# Patient Record
Sex: MALE | Race: WHITE | URBAN - METROPOLITAN AREA
[De-identification: names, ages, dates, MRNs, and addresses within clinical notes are randomized per-mention and may not be internally consistent; named-entity substitution may affect disease eponyms.]

---

## 2019-01-23 ENCOUNTER — APPOINTMENT (OUTPATIENT)
Dept: GENERAL RADIOLOGY | Facility: CLINIC | Age: 24
End: 2019-01-23
Payer: OTHER MISCELLANEOUS

## 2019-01-23 ENCOUNTER — HOSPITAL ENCOUNTER (EMERGENCY)
Facility: CLINIC | Age: 24
Discharge: HOME | End: 2019-01-23
Attending: EMERGENCY MEDICINE | Admitting: EMERGENCY MEDICINE
Payer: OTHER MISCELLANEOUS

## 2019-01-23 VITALS
SYSTOLIC BLOOD PRESSURE: 129 MMHG | HEART RATE: 63 BPM | WEIGHT: 182 LBS | HEIGHT: 72 IN | OXYGEN SATURATION: 99 % | TEMPERATURE: 98.8 F | RESPIRATION RATE: 18 BRPM | DIASTOLIC BLOOD PRESSURE: 78 MMHG | BODY MASS INDEX: 24.65 KG/M2

## 2019-01-23 DIAGNOSIS — S60.221A CONTUSION OF RIGHT HAND, INITIAL ENCOUNTER: ICD-10-CM

## 2019-01-23 PROCEDURE — 99283 EMERGENCY DEPT VISIT LOW MDM: CPT

## 2019-01-23 PROCEDURE — 25000132 ZZH RX MED GY IP 250 OP 250 PS 637: Performed by: EMERGENCY MEDICINE

## 2019-01-23 PROCEDURE — 73130 X-RAY EXAM OF HAND: CPT | Mod: RT

## 2019-01-23 RX ORDER — IBUPROFEN 600 MG/1
600 TABLET, FILM COATED ORAL ONCE
Status: COMPLETED | OUTPATIENT
Start: 2019-01-23 | End: 2019-01-23

## 2019-01-23 RX ORDER — IBUPROFEN 800 MG/1
800 TABLET, FILM COATED ORAL EVERY 8 HOURS PRN
Qty: 60 TABLET | Refills: 0 | Status: SHIPPED | OUTPATIENT
Start: 2019-01-23 | End: 2019-01-31

## 2019-01-23 RX ADMIN — IBUPROFEN 600 MG: 600 TABLET ORAL at 10:29

## 2019-01-23 ASSESSMENT — ENCOUNTER SYMPTOMS: FEVER: 0

## 2019-01-23 ASSESSMENT — MIFFLIN-ST. JEOR: SCORE: 1853.55

## 2019-01-23 NOTE — ED PROVIDER NOTES
History     Chief Complaint:  Hand Pain    HPI   Cody Ryan Baumgarten is a 23 year old male who presents with hand pain. The patient slipped and fell on the ice. He went to brace himself for the fall using his right hand resulting in pain to his right wrist. The patient denies hitting his head or any loss of consciousness. He denies being on blood thinners. He states he writes with his left hand but does everything else with his right hand.     Allergies:  No known drug allergies.    Medications:    The patient is currently on no regular medications.     Past Medical History:    History reviewed.  No significant past medical history.     Past Surgical History:    History reviewed. No pertinent past surgical history.    Family History:    History reviewed. No pertinent family history.    Social History:  Patient is single  Tobacco Use: Current smoker  Alcohol Use: Yes  PCP: Physician No Ref-Primary     Review of Systems   Constitutional: Negative for fever.   Musculoskeletal:        Right wrist pain   Neurological:        No LOC   All other systems reviewed and are negative.    Physical Exam   First Vitals:  Patient Vitals for the past 24 hrs:   BP Temp Temp src Pulse Resp SpO2 Height Weight   01/23/19 1018 129/78 98.8  F (37.1  C) Oral 63 18 99 % 1.829 m (6') 82.6 kg (182 lb)     Physical Exam  General: Patient is alert and normal appearing.  HEENT: Head atraumatic    Eyes: pupils equal and reactive. Conjunctiva clear   Nares: patent   Oropharynx: no lesions, uvula midline, no palatal draping, normal voice, no trismus  Neck: Supple without lymphadenopathy, no meningismus  Chest: Heart regular rate and rhythm.   Lungs: Equal clear to auscultation with no wheeze or rales  Abdomen: Soft, non tender, nondistended, normal bowel sounds  Back: No costovertebral angle tenderness, no midline C, T or L spine tenderness  Neuro: Grossly nonfocal, normal speech, strength equal bilaterally, CN 2-12 intact  Extremities: No  deformities, equal radial and DP pulses. No clubbing, cyanosis. Right Hand: No edema, tenderness of the right hand thenar eminence with no tenderness of the snuffbox or on axial loading of the right thumb, no distal radius or ulna tenderness to palpation, distal is neurovascularly intact.  Skin: Warm and dry with no rash.       Emergency Department Course     Imaging:  Radiographic findings were communicated with the patient who voiced understanding of the findings.  XR hand right 3 views:  No definite acute abnormality per radiology read.    Interventions:  1029: Ibuprofen 600mg PO    Emergency Department Course:  10:19 AM Nursing notes and vitals reviewed.  I performed an exam of the patient as documented above.   10:58 AM I rechecked on and update the patient.  11:03 AM Findings and plan explained to the patient. Patient discharged home with instructions regarding supportive care, medications, and reasons to return. The importance of close follow-up was reviewed.     Impression & Plan      Medical Decision Making:  Patient is a 23-year-old male with slip and fall on the ice complaining of right thenar eminence pain.  X-ray was negative for acute abnormality here in the emergency department.  He has no snuffbox tenderness or axial load of his thumb tenderness to suggest scaphoid fracture but he was given a removable thumb spica splint given the location of his discomfort.  He is encouraged to rest ice and elevate his hand and if symptoms fail to improve over the next 3 days he should have repeat x-rays done by orthopedics.  There is no evidence to suggest wrist, elbow, shoulder injury.  Head to toe trauma exam was otherwise negative for injury.  Patient received ibuprofen here in the emergency department with some improvement of his pain.  He was offered work excuse but stated that he did not need it.  He is encouraged to have limited use of his right hand until his pain improves.  All patient's questions and  concerns were addressed and return precautions reviewed at length.  Patient expressed agreement understanding the plan.    Diagnosis:    ICD-10-CM    1. Contusion of right hand, initial encounter S60.221A        Disposition:  discharged to home    Discharge Medications:     Medication List      Started    ibuprofen 800 MG tablet  Commonly known as:  ADVIL/MOTRIN  800 mg, Oral, EVERY 8 HOURS PRN          I, Bradley Aasen, kelsea serving as a scribe on 1/23/2019 at 10:22 AM to personally document services performed by Alyssia Jimenez MD based on my observations and the provider's statements to me.        Alyssia Jimenez MD  01/23/19 9900

## 2019-01-23 NOTE — ED AVS SNAPSHOT
Emergency Department  [address] [facility] [address]  Phone:  [phone]  Fax:  [phone]                                    Cody Ryan Baumgarten   MRN: [id number]    Department:   Emergency Department   Date of Visit:  1/23/2019           After Visit Summary Signature Page    I have received my discharge instructions, and my questions have been answered. I have discussed any challenges I see with this plan with the nurse or doctor.    ..........................................................................................................................................  Patient/Patient Representative Signature      ..........................................................................................................................................  Patient Representative Print Name and Relationship to Patient    ..................................................               ................................................  Date                                   Time    ..........................................................................................................................................  Reviewed by Signature/Title    ...................................................              ..............................................  Date                                               Time          22EPIC Rev 08/18